# Patient Record
Sex: MALE | Race: WHITE | NOT HISPANIC OR LATINO | Employment: OTHER | URBAN - METROPOLITAN AREA
[De-identification: names, ages, dates, MRNs, and addresses within clinical notes are randomized per-mention and may not be internally consistent; named-entity substitution may affect disease eponyms.]

---

## 2017-11-07 ENCOUNTER — OFFICE VISIT (OUTPATIENT)
Dept: CARDIOLOGY | Facility: CLINIC | Age: 67
End: 2017-11-07

## 2017-11-07 VITALS
HEART RATE: 73 BPM | HEIGHT: 71 IN | BODY MASS INDEX: 36.26 KG/M2 | DIASTOLIC BLOOD PRESSURE: 98 MMHG | WEIGHT: 259 LBS | SYSTOLIC BLOOD PRESSURE: 158 MMHG

## 2017-11-07 DIAGNOSIS — I10 ESSENTIAL HYPERTENSION: ICD-10-CM

## 2017-11-07 DIAGNOSIS — I48.20 CHRONIC ATRIAL FIBRILLATION (HCC): Primary | ICD-10-CM

## 2017-11-07 PROCEDURE — 93000 ELECTROCARDIOGRAM COMPLETE: CPT | Performed by: INTERNAL MEDICINE

## 2017-11-07 PROCEDURE — 99213 OFFICE O/P EST LOW 20 MIN: CPT | Performed by: INTERNAL MEDICINE

## 2017-11-07 RX ORDER — CETIRIZINE HYDROCHLORIDE 10 MG/1
10 TABLET ORAL DAILY
COMMUNITY

## 2017-11-07 RX ORDER — DOXAZOSIN MESYLATE 4 MG/1
4 TABLET ORAL NIGHTLY
COMMUNITY
End: 2022-01-01 | Stop reason: ALTCHOICE

## 2017-11-07 RX ORDER — TRIAMTERENE AND HYDROCHLOROTHIAZIDE 75; 50 MG/1; MG/1
1 TABLET ORAL DAILY
COMMUNITY
End: 2021-01-01 | Stop reason: ALTCHOICE

## 2017-11-13 PROBLEM — I10 HYPERTENSION: Status: ACTIVE | Noted: 2017-11-13

## 2017-11-13 PROBLEM — I48.91 ATRIAL FIBRILLATION (HCC): Status: ACTIVE | Noted: 2017-11-13

## 2017-11-13 NOTE — PROGRESS NOTES
Subjective:     Encounter Date:11/07/2017      Patient ID: Wesley Billingsley is a 67 y.o. male.    Chief Complaint:  Hypertension   This is a chronic problem. The current episode started more than 1 year ago. The problem is unchanged. The problem is controlled. Associated symptoms include palpitations. Pertinent negatives include no chest pain or shortness of breath. There is no history of angina, kidney disease or CAD/MI.   Atrial Fibrillation   Presents for follow-up visit. Symptoms include palpitations. Symptoms are negative for chest pain, dizziness, hypertension, shortness of breath, syncope, tachycardia and weakness. Past medical history includes atrial fibrillation.       Patient presents today for reevaluation.  Occasionally has some palpitations.  He denies shortness of breath edema lightheadedness chest pain or fatigue.    Review of Systems   Constitution: Negative for weakness.   Cardiovascular: Positive for palpitations. Negative for chest pain and syncope.   Respiratory: Negative for shortness of breath.    Neurological: Negative for dizziness.   All other systems reviewed and are negative.        ECG 12 Lead  Date/Time: 11/7/2017 11:42 AM  Performed by: ARSEN ANDERSON  Authorized by: ARSEN ANDERSON   Comparison: compared with previous ECG from 7/12/2016  Similar to previous ECG  Rhythm: atrial fibrillation  Clinical impression: abnormal ECG               Objective:     Physical Exam   Constitutional: He is oriented to person, place, and time. He appears well-developed.   HENT:   Head: Normocephalic.   Eyes: Conjunctivae are normal.   Neck: Normal range of motion.   Cardiovascular: Normal rate and normal heart sounds.  An irregularly irregular rhythm present.   Pulmonary/Chest: Breath sounds normal.   Abdominal: Soft. Bowel sounds are normal.   Musculoskeletal: Normal range of motion. He exhibits no edema.   Neurological: He is alert and oriented to person, place, and time.   Skin: Skin is warm  and dry.   Psychiatric: He has a normal mood and affect. His behavior is normal.   Vitals reviewed.      Lab Review:       Assessment:          Diagnosis Plan   1. Chronic atrial fibrillation     2. Essential hypertension            Plan:       1.  History of atrial fibrillation.  Patient remains stable heart rates well-controlled he is anticoagulated.  2.  Hypertension blood pressures a little bit elevated today told to watch it closely if remains elevated to contact my office.  I also told him to minimize his salt intake.  3.  Otherwise I think he's doing well follow-up in one year sooner if he has issues    Atrial Fibrillation and Atrial Flutter  Assessment  • The patient has persistent atrial fibrillation  • This is non-valvular in etiology  • The patient's CHADS2-VASc score is 2  • A [id number] score of 2 or more is considered a high risk for a thromboembolic event  • Apixaban prescribed    Plan  • Continue in atrial fibrillation with rate control  • Continue apixaban for antithrombotic therapy, bleeding issues discussed  • Continue beta blocker for rate control

## 2019-02-01 ENCOUNTER — OFFICE VISIT CONVERTED (OUTPATIENT)
Dept: CARDIOLOGY | Facility: CLINIC | Age: 69
End: 2019-02-01
Attending: INTERNAL MEDICINE

## 2019-02-01 ENCOUNTER — CONVERSION ENCOUNTER (OUTPATIENT)
Dept: CARDIOLOGY | Facility: CLINIC | Age: 69
End: 2019-02-01

## 2019-03-27 ENCOUNTER — CONVERSION ENCOUNTER (OUTPATIENT)
Dept: CARDIOLOGY | Facility: CLINIC | Age: 69
End: 2019-03-27
Attending: INTERNAL MEDICINE

## 2019-04-02 ENCOUNTER — CONVERSION ENCOUNTER (OUTPATIENT)
Dept: CARDIOLOGY | Facility: CLINIC | Age: 69
End: 2019-04-02

## 2019-10-15 ENCOUNTER — OFFICE VISIT CONVERTED (OUTPATIENT)
Dept: CARDIOLOGY | Facility: CLINIC | Age: 69
End: 2019-10-15
Attending: INTERNAL MEDICINE

## 2019-10-15 ENCOUNTER — CONVERSION ENCOUNTER (OUTPATIENT)
Dept: CARDIOLOGY | Facility: CLINIC | Age: 69
End: 2019-10-15

## 2020-09-29 ENCOUNTER — OFFICE VISIT CONVERTED (OUTPATIENT)
Dept: CARDIOLOGY | Facility: CLINIC | Age: 70
End: 2020-09-29
Attending: INTERNAL MEDICINE

## 2020-09-29 ENCOUNTER — CONVERSION ENCOUNTER (OUTPATIENT)
Dept: CARDIOLOGY | Facility: CLINIC | Age: 70
End: 2020-09-29

## 2021-01-01 ENCOUNTER — TELEPHONE (OUTPATIENT)
Dept: CARDIOLOGY | Facility: CLINIC | Age: 71
End: 2021-01-01

## 2021-01-01 ENCOUNTER — OFFICE VISIT (OUTPATIENT)
Dept: CARDIOLOGY | Facility: CLINIC | Age: 71
End: 2021-01-01

## 2021-01-01 VITALS
SYSTOLIC BLOOD PRESSURE: 152 MMHG | HEART RATE: 62 BPM | DIASTOLIC BLOOD PRESSURE: 72 MMHG | WEIGHT: 261 LBS | HEIGHT: 72 IN | BODY MASS INDEX: 35.35 KG/M2

## 2021-01-01 DIAGNOSIS — I10 ESSENTIAL HYPERTENSION: Primary | ICD-10-CM

## 2021-01-01 PROCEDURE — 99214 OFFICE O/P EST MOD 30 MIN: CPT | Performed by: INTERNAL MEDICINE

## 2021-05-13 NOTE — PROGRESS NOTES
Progress Note      Patient Name: Wesley Billingsley   Patient ID: [id number]   Sex: Male   YOB: 1950    Primary Care Provider: Denis Bucio MD   Referring Provider: Denis Bucio MD    Visit Date: September 29, 2020    Provider: Pablo Hurley MD   Location: [facility]   Location Address: [address] [place] [address] [facility]  [address]  [place], [place]  [id number]   Location Phone: [phone]          Chief Complaint     Followup visit for atrial fibrillation and hypertension.       History Of Present Illness  REFERRING CARE PROVIDER: Denis Bucio MD   Wesley Billingsley is a 69 year old /White male with chronic atrial fibrillation and previous syncopal episode who is here for a routine followup visit. Last evaluation was in October of 2019. Recently, he was noted to have a high blood pressure, and three weeks back, the dose of metoprolol was increased to 100 mg twice daily by his primary care provider. Patient reports that his blood pressure is still on the higher side during home recordings, systolic mostly in the high 140s and low 150s. He denies having any major palpitations. He has shortness of breath on activities on moderate exertion. No chest pain. No syncopal episodes. No dizziness. Fairly active at baseline. He is able to walk around comfortably with the prosthetic leg at this time.   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   PSYCHOSOCIAL HISTORY: Denies alcohol or tobacco use. Walks 2-3 miles for exercise.   CURRENT MEDICATIONS: Eliquis 5 mg b.i.d.; metoprolol  mg b.i.d.; triamterene-hydrochlorothiazide 75-50 mg daily; terazosin 2 mg daily; Zyrtec 10 mg daily; Ambien  10 mg p.r.n.; gabapentin 300 mg p.r.n.; Percocet p.r.n.; alprazolam 0.5 mg p.r.n.       Review of Systems  · Cardiovascular  o Admits  o : palpitations (fast, fluttering, or skipping beats), shortness of breath while walking or lying flat  o Denies  o : swelling (feet, ankles, hands), chest pain or angina pectoris   · Respiratory  o Denies  o : chronic or frequent cough, asthma or wheezing      Vitals  Date Time BP Position Site L\R Cuff Size HR RR TEMP (F) WT  HT  BMI kg/m2 BSA m2 O2 Sat FR L/min FiO2 HC       09/29/2020 04:20 /88 Sitting    69 - R  97.1 260lbs 0oz 6'   35.26 2.45       09/29/2020 04:20 /81 Sitting    57 - R                   Physical Examination  · Respiratory  o Auscultation of Lungs  o : Clear to auscultation bilaterally. No crackles or rhonchi.  · Cardiovascular  o Heart  o : Irregularly irregular. No S3. No murmur, rubs, or gallops.   · Gastrointestinal  o Abdominal Examination  o : Soft, nontender, nondistended. No free fluid. Bowel sounds heard in all four quadrants.  · Extremities  o Extremities  o : Left above-knee amputation. Right lower extremity with no pedal edema. Distal pulses present.   · Labs  o Labs  o : On 08/28/2020, hemoglobin 13.2, hematocrit 39.3, platelet count 205,000, BUN 14, creatinine 0.98, sodium 36, potassium 4.4, chloride 96, calcium 9.3, total protein 7.3, albumin 4.3, AST 23, ALT 20, total cholesterol 164, triglycerides 114, LDL 82, hemoglobin A1c 5.2%, TSH 3.48.           Assessment     ASSESSMENT & PLAN:    1.  Hypertension.  Blood pressure is on the higher side per home recordings.  Continue metoprolol and        triamterene-hydrochlorothiazide at the current dose.  Will add amlodipine 2.5 mg p.o. daily for better        blood pressure control.  2.  Atrial fibrillation, chronic.  Ventricular rates well controlled.  No significant symptoms.  Continue metoprolol        and Eliquis.    3.  Follow up in 6 months.        MD DENTON Petty:barrie              Electronically Signed by: Dejah Aggarwal-, Other -Author on October 5, 2020 08:15:30 AM  Electronically Co-signed by: Pablo Hurley MD -Reviewer on October 5, 2020 08:29:26 AM

## 2021-05-14 VITALS
BODY MASS INDEX: 35.21 KG/M2 | SYSTOLIC BLOOD PRESSURE: 145 MMHG | HEIGHT: 72 IN | TEMPERATURE: 97.1 F | HEART RATE: 69 BPM | WEIGHT: 260 LBS | DIASTOLIC BLOOD PRESSURE: 88 MMHG

## 2021-05-15 VITALS
BODY MASS INDEX: 29.39 KG/M2 | SYSTOLIC BLOOD PRESSURE: 133 MMHG | HEIGHT: 72 IN | WEIGHT: 217 LBS | DIASTOLIC BLOOD PRESSURE: 73 MMHG | HEART RATE: 77 BPM

## 2021-05-15 VITALS
BODY MASS INDEX: 33.18 KG/M2 | HEART RATE: 103 BPM | WEIGHT: 245 LBS | DIASTOLIC BLOOD PRESSURE: 63 MMHG | SYSTOLIC BLOOD PRESSURE: 144 MMHG | HEIGHT: 72 IN

## 2021-05-15 VITALS
SYSTOLIC BLOOD PRESSURE: 161 MMHG | HEIGHT: 72 IN | HEART RATE: 73 BPM | WEIGHT: 253 LBS | BODY MASS INDEX: 34.27 KG/M2 | DIASTOLIC BLOOD PRESSURE: 91 MMHG

## 2021-10-12 ENCOUNTER — OFFICE VISIT (OUTPATIENT)
Dept: CARDIOLOGY | Facility: CLINIC | Age: 71
End: 2021-10-12

## 2021-10-12 VITALS
SYSTOLIC BLOOD PRESSURE: 166 MMHG | DIASTOLIC BLOOD PRESSURE: 76 MMHG | BODY MASS INDEX: 35.08 KG/M2 | HEART RATE: 71 BPM | HEIGHT: 72 IN | WEIGHT: 259 LBS

## 2021-10-12 DIAGNOSIS — I48.11 LONGSTANDING PERSISTENT ATRIAL FIBRILLATION (HCC): Primary | ICD-10-CM

## 2021-10-12 DIAGNOSIS — I10 ESSENTIAL HYPERTENSION: ICD-10-CM

## 2021-10-12 PROCEDURE — 99214 OFFICE O/P EST MOD 30 MIN: CPT | Performed by: INTERNAL MEDICINE

## 2021-10-12 RX ORDER — MULTIPLE VITAMINS W/ MINERALS TAB 9MG-400MCG
1 TAB ORAL DAILY
COMMUNITY

## 2021-10-12 RX ORDER — AMLODIPINE BESYLATE 10 MG/1
10 TABLET ORAL DAILY
Qty: 90 TABLET | Refills: 2 | Status: SHIPPED | OUTPATIENT
Start: 2021-10-12

## 2021-10-12 RX ORDER — AMLODIPINE BESYLATE 5 MG/1
TABLET ORAL
COMMUNITY
Start: 2021-09-12 | End: 2021-10-12

## 2021-10-12 RX ORDER — ALPRAZOLAM 0.5 MG/1
TABLET ORAL
COMMUNITY
Start: 2021-09-27

## 2021-10-12 RX ORDER — ZOLPIDEM TARTRATE 10 MG/1
TABLET ORAL
COMMUNITY
Start: 2021-09-26

## 2021-10-12 RX ORDER — VALSARTAN 80 MG/1
80 TABLET ORAL DAILY
Qty: 90 TABLET | Refills: 2 | Status: SHIPPED | OUTPATIENT
Start: 2021-10-12

## 2021-10-12 RX ORDER — GABAPENTIN 300 MG/1
CAPSULE ORAL
COMMUNITY
Start: 2021-09-26

## 2021-10-12 RX ORDER — OXYCODONE AND ACETAMINOPHEN 10; 325 MG/1; MG/1
TABLET ORAL
COMMUNITY
Start: 2021-10-03

## 2021-10-12 RX ORDER — VALSARTAN 80 MG/1
TABLET ORAL
COMMUNITY
Start: 2021-09-12 | End: 2021-10-12 | Stop reason: SDUPTHER

## 2021-10-27 NOTE — ASSESSMENT & PLAN NOTE
Blood pressure on the higher side in the office today.  Blood pressure on the higher side at home as well.  Will increase amlodipine dose to 10 mg daily.  Continue valsartan, metoprolol and triamterene/amiloride at the current dose.  Will do a close follow-up in 1 month with home blood pressure log.  He is advised to call our office earlier for any persistently elevated or low blood pressure in the meantime.

## 2021-10-27 NOTE — ASSESSMENT & PLAN NOTE
Ventricular rates very well controlled.  Continue metoprolol at the current dose along with Eliquis for anticoagulation.  LV function is preserved.  Recent labs showed stable hemoglobin.

## 2021-10-27 NOTE — PROGRESS NOTES
Relation, previous syncopal episode who is here for follow-up visit.  CARDIOLOGY FOLLOW-UP PROGRESS NOTE        Chief Complaint  Hypertension and Atrial Fibrillation    Subjective            Wesley Billingsley presents to [facility]  History of Present Illness    This is a 71-year-old male with chronic atrial fibrillation, he was last seen in the office on September 29, 2020.  Today, he denies having any complaints.  There is no dizziness or palpitations.  He is able to walk around with his prosthetic leg.  Denies any dizziness or syncopal episodes.  No bleeding manifestations.  No recent hospitalizations or ER visits.   Blood pressure was noted to be on the higher side during recent PCP office visit.  Valsartan was added to the regimen.    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     Medical History:  Past Medical History:   Diagnosis Date   • Atrial fibrillation (HCC)     chronic   • Hypertension        Surgical History: has a past surgical history that includes Knee surgery.     Family History: family history includes Cancer in his sister; Diabetes in his mother; Heart disease in his father and mother; Hypertension in his father and mother.     Social History: reports that he has never smoked. He has never used smokeless tobacco. He reports current alcohol use. He reports that he does not use drugs.    Allergies: Patient has no known allergies.    Current Outpatient Medications on File Prior to Visit   Medication Sig   • ALPRAZolam (XANAX) 0.5 MG tablet    • apixaban (ELIQUIS) 5 MG tablet tablet Take 5 mg by mouth 2 (Two) Times a Day.   • cetirizine (zyrTEC) 10 MG tablet Take  "10 mg by mouth Daily.   • gabapentin (NEURONTIN) 300 MG capsule    • metoprolol succinate XL (TOPROL-XL) 200 MG 24 hr tablet TAKE ONE-HALF TABLET BY MOUTH TWICE DAILY (Patient taking differently: Take 200 mg by mouth 2 (two) times a day.)   • multivitamin with minerals tablet tablet Take 1 tablet by mouth Daily.   • oxyCODONE-acetaminophen (PERCOCET)  MG per tablet    • triamterene-hydrochlorothiazide (MAXZIDE) 75-50 MG per tablet Take 1 tablet by mouth Daily.   • zolpidem (AMBIEN) 10 MG tablet    • doxazosin (CARDURA) 4 MG tablet Take 4 mg by mouth Every Night.     No current facility-administered medications on file prior to visit.          Review of Systems   Respiratory: Negative for cough, shortness of breath and wheezing.    Cardiovascular: Negative for chest pain, palpitations and leg swelling.   Gastrointestinal: Negative for nausea and vomiting.   Neurological: Negative for dizziness and syncope.        Objective     /76 (BP Location: Left arm, Patient Position: Sitting)   Pulse 71   Ht 182.9 cm (72\")   Wt 117 kg (259 lb)   BMI 35.13 kg/m²       Physical Exam    General : Alert, awake, no acute distress  Neck : Supple, no carotid bruit, no jugular venous distention  CVS : Irregularly irregular, no murmur, rubs or gallops  Lungs: Clear to auscultation bilaterally, no crackles or rhonchi  Abdomen: Soft, nontender, bowel sounds heard in all 4 quadrants  Extremities: Warm, well-perfused, no pedal edema    Result Review :     The following data was reviewed by: Pablo Hurley MD on 10/12/2021:    Labs done on 7/14/2021 at PCP office showed hemoglobin 15.1 hematocrit 44.7 platelet count 272 BUN 14 creatinine 0.86 sodium 139 potassium 4.5 chloride 102 AST 31 ALT 28    Total cholesterol 179 triglycerides 82 HDL 55 LDL is 109 hemoglobin A1c is 5.2                 Assessment and Plan        Diagnoses and all orders for this visit:    1. Longstanding persistent atrial fibrillation (HCC) " (Primary)  Assessment & Plan:  Ventricular rates very well controlled.  Continue metoprolol at the current dose along with Eliquis for anticoagulation.  LV function is preserved.  Recent labs showed stable hemoglobin.      2. Essential hypertension  Assessment & Plan:  Blood pressure on the higher side in the office today.  Blood pressure on the higher side at home as well.  Will increase amlodipine dose to 10 mg daily.  Continue valsartan, metoprolol and triamterene/amiloride at the current dose.  Will do a close follow-up in 1 month with home blood pressure log.  He is advised to call our office earlier for any persistently elevated or low blood pressure in the meantime.    Orders:  -     amLODIPine (NORVASC) 10 MG tablet; 1 tablet Daily.  Dispense: 90 tablet; Refill: 2  -     valsartan (DIOVAN) 80 MG tablet; Take 1 tablet by mouth Daily.  Dispense: 90 tablet; Refill: 2            Follow Up     Return in about 1 month (around 11/12/2021) for Recheck.    Patient was given instructions and counseling regarding his condition or for health maintenance advice. Please see specific information pulled into the AVS if appropriate.

## 2021-11-17 NOTE — TELEPHONE ENCOUNTER
Procedure: Colonoscopy and EGD    Med Directive: Eliquis    PMH: Chronic Afib; BPH; Syncopal episode; Neg for DM    Last Seen: 10/12/2021    Labs (7/14/2021): cr 0.86

## 2021-11-18 NOTE — TELEPHONE ENCOUNTER
Mr. Billingsley may proceed with EGD and colonoscopy as planned.  No further preoperative cardiac work-up required.  He will be at a moderate risk for perioperative cardiac events.    Eliquis may be held for 2 days before the anticipated surgery.      Electronically signed by Pablo Hurley MD, 11/18/21, 2:11 PM EST.

## 2021-11-19 NOTE — PROGRESS NOTES
CARDIOLOGY FOLLOW-UP PROGRESS NOTE        Chief Complaint  Follow-up, Atrial Fibrillation, and Hypertension    Subjective            Wesley Billingsley presents to [facility]  History of Present Illness    This is a 71-year-old male with chronic atrial fibrillation, hypertension who is here for follow-up visit.  During last office visit, his blood pressure was noted to be significantly elevated.  We recommended to increase the dose of amlodipine to 10 mg daily.  Today patient got a log of his home blood pressure readings and they are better controlled than before.  However he is taking 5 mg of amlodipine in the morning and 2.5 mg in the evening.  He continues to take metoprolol and valsartan as before.  Overall he feels fine denies any new complaints.  Denies any palpitations or dizziness or chest pain.       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     Medical History:  Past Medical History:   Diagnosis Date   • Atrial fibrillation (HCC)     chronic   • Hypertension        Surgical History: has a past surgical history that includes Knee surgery.     Family History: family history includes Cancer in his sister; Diabetes in his mother; Heart disease in his father and mother; Hypertension in his father and mother.     Social History: reports that he has never smoked. He has never used smokeless tobacco. He reports current alcohol use. He reports that he does not use drugs.    Allergies: Patient has no known allergies.    Current Outpatient Medications on File Prior to Visit   Medication Sig   • ALPRAZolam (XANAX) 0.5 MG tablet    • amLODIPine (NORVASC) 10 MG tablet 1 tablet  "Daily.   • apixaban (ELIQUIS) 5 MG tablet tablet Take 5 mg by mouth 2 (Two) Times a Day.   • cetirizine (zyrTEC) 10 MG tablet Take 10 mg by mouth Daily.   • doxazosin (CARDURA) 4 MG tablet Take 4 mg by mouth Every Night.   • gabapentin (NEURONTIN) 300 MG capsule    • metoprolol succinate XL (TOPROL-XL) 200 MG 24 hr tablet TAKE ONE-HALF TABLET BY MOUTH TWICE DAILY (Patient taking differently: Take 200 mg by mouth 2 (two) times a day.)   • multivitamin with minerals tablet tablet Take 1 tablet by mouth Daily.   • oxyCODONE-acetaminophen (PERCOCET)  MG per tablet    • valsartan (DIOVAN) 80 MG tablet Take 1 tablet by mouth Daily.   • zolpidem (AMBIEN) 10 MG tablet    • [DISCONTINUED] triamterene-hydrochlorothiazide (MAXZIDE) 75-50 MG per tablet Take 1 tablet by mouth Daily.     No current facility-administered medications on file prior to visit.          Review of Systems   Respiratory: Negative for cough, shortness of breath and wheezing.    Cardiovascular: Negative for chest pain, palpitations and leg swelling.   Gastrointestinal: Negative for nausea and vomiting.   Neurological: Negative for dizziness and syncope.        Objective     /72   Pulse 62   Ht 182.9 cm (72\")   Wt 118 kg (261 lb)   BMI 35.40 kg/m²       Physical Exam    General : Alert, awake, no acute distress  Neck : Supple, no carotid bruit, no jugular venous distention  CVS : Irregularly irregular, no murmur, rubs or gallops  Lungs: Clear to auscultation bilaterally, no crackles or rhonchi  Abdomen: Soft, nontender, bowel sounds heard in all 4 quadrants  Extremities: Warm, well-perfused, no pedal edema    Result Review :     The following data was reviewed by: Pablo Hurley MD on 11/17/2021:                   Assessment and Plan        Diagnoses and all orders for this visit:    1. Essential hypertension (Primary)  Assessment & Plan:  Blood pressure is better controlled today, but still above goal.  Home blood pressure log also shows " intermittent episodes of high blood pressure.  Recommend to increase the dose of amlodipine to 10 mg daily as suggested during the previous visit.  He just picked up the prescription yesterday.  He will take valsartan and metoprolol in the morning.  Amlodipine and the second dose of metoprolol can be taken in the evening.  He will continue to check blood pressure intermittently at home.          2.  Atrial fibrillation    No new changes.  Continue same medicines.        Follow Up     Return in about 6 months (around 5/17/2022) for Next scheduled follow up.    Patient was given instructions and counseling regarding his condition or for health maintenance advice. Please see specific information pulled into the AVS if appropriate.

## 2021-11-19 NOTE — ASSESSMENT & PLAN NOTE
Blood pressure is better controlled today, but still above goal.  Home blood pressure log also shows intermittent episodes of high blood pressure.  Recommend to increase the dose of amlodipine to 10 mg daily as suggested during the previous visit.  He just picked up the prescription yesterday.  He will take valsartan and metoprolol in the morning.  Amlodipine and the second dose of metoprolol can be taken in the evening.  He will continue to check blood pressure intermittently at home.

## 2022-01-01 ENCOUNTER — TRANSCRIBE ORDERS (OUTPATIENT)
Dept: ADMINISTRATIVE | Facility: HOSPITAL | Age: 72
End: 2022-01-01

## 2022-01-01 ENCOUNTER — APPOINTMENT (OUTPATIENT)
Dept: GENERAL RADIOLOGY | Facility: HOSPITAL | Age: 72
End: 2022-01-01

## 2022-01-01 ENCOUNTER — HOSPITAL ENCOUNTER (EMERGENCY)
Facility: HOSPITAL | Age: 72
Discharge: LEFT BEFORE DISCHARGE | End: 2022-08-22

## 2022-01-01 ENCOUNTER — OFFICE VISIT (OUTPATIENT)
Dept: CARDIOLOGY | Facility: CLINIC | Age: 72
End: 2022-01-01

## 2022-01-01 ENCOUNTER — LAB (OUTPATIENT)
Dept: LAB | Facility: HOSPITAL | Age: 72
End: 2022-01-01

## 2022-01-01 ENCOUNTER — TELEPHONE (OUTPATIENT)
Dept: CARDIOLOGY | Facility: CLINIC | Age: 72
End: 2022-01-01

## 2022-01-01 ENCOUNTER — HOSPITAL ENCOUNTER (OUTPATIENT)
Dept: GENERAL RADIOLOGY | Facility: HOSPITAL | Age: 72
Discharge: HOME | End: 2022-08-08
Admitting: FAMILY MEDICINE

## 2022-01-01 VITALS
TEMPERATURE: 98 F | BODY MASS INDEX: 35.74 KG/M2 | SYSTOLIC BLOOD PRESSURE: 134 MMHG | RESPIRATION RATE: 18 BRPM | DIASTOLIC BLOOD PRESSURE: 74 MMHG | WEIGHT: 263.89 LBS | HEART RATE: 67 BPM | HEIGHT: 72 IN | OXYGEN SATURATION: 97 %

## 2022-01-01 VITALS
DIASTOLIC BLOOD PRESSURE: 49 MMHG | SYSTOLIC BLOOD PRESSURE: 130 MMHG | HEIGHT: 72 IN | HEART RATE: 69 BPM | BODY MASS INDEX: 37.11 KG/M2 | WEIGHT: 274 LBS

## 2022-01-01 VITALS
HEIGHT: 72 IN | WEIGHT: 264 LBS | DIASTOLIC BLOOD PRESSURE: 54 MMHG | SYSTOLIC BLOOD PRESSURE: 128 MMHG | HEART RATE: 50 BPM | BODY MASS INDEX: 35.76 KG/M2

## 2022-01-01 DIAGNOSIS — R06.02 SOB (SHORTNESS OF BREATH): ICD-10-CM

## 2022-01-01 DIAGNOSIS — Z12.5 SCREENING FOR PROSTATE CANCER: ICD-10-CM

## 2022-01-01 DIAGNOSIS — R53.83 TIREDNESS: ICD-10-CM

## 2022-01-01 DIAGNOSIS — I10 ESSENTIAL HYPERTENSION, MALIGNANT: ICD-10-CM

## 2022-01-01 DIAGNOSIS — Z79.899 ENCOUNTER FOR LONG-TERM (CURRENT) USE OF OTHER MEDICATIONS: ICD-10-CM

## 2022-01-01 DIAGNOSIS — R06.00 DYSPNEA, UNSPECIFIED TYPE: ICD-10-CM

## 2022-01-01 DIAGNOSIS — I10 ESSENTIAL HYPERTENSION: ICD-10-CM

## 2022-01-01 DIAGNOSIS — D64.9 ANEMIA, UNSPECIFIED TYPE: Primary | ICD-10-CM

## 2022-01-01 DIAGNOSIS — R06.02 SOB (SHORTNESS OF BREATH): Primary | ICD-10-CM

## 2022-01-01 DIAGNOSIS — I48.11 LONGSTANDING PERSISTENT ATRIAL FIBRILLATION: Primary | ICD-10-CM

## 2022-01-01 DIAGNOSIS — I48.11 LONGSTANDING PERSISTENT ATRIAL FIBRILLATION: ICD-10-CM

## 2022-01-01 DIAGNOSIS — R73.09 IMPAIRED GLUCOSE TOLERANCE TEST: ICD-10-CM

## 2022-01-01 DIAGNOSIS — E55.9 VITAMIN D DEFICIENCY: ICD-10-CM

## 2022-01-01 DIAGNOSIS — E78.5 HYPERLIPIDEMIA, UNSPECIFIED HYPERLIPIDEMIA TYPE: ICD-10-CM

## 2022-01-01 DIAGNOSIS — R50.9 FEVER IN ADULT: ICD-10-CM

## 2022-01-01 DIAGNOSIS — I10 ESSENTIAL HYPERTENSION: Primary | ICD-10-CM

## 2022-01-01 DIAGNOSIS — R05.9 COUGH: ICD-10-CM

## 2022-01-01 DIAGNOSIS — I25.10 DISEASE OF CARDIOVASCULAR SYSTEM: ICD-10-CM

## 2022-01-01 DIAGNOSIS — E78.5 HYPERLIPIDEMIA, UNSPECIFIED HYPERLIPIDEMIA TYPE: Primary | ICD-10-CM

## 2022-01-01 DIAGNOSIS — R05.9 COUGH: Primary | ICD-10-CM

## 2022-01-01 LAB
25(OH)D3 SERPL-MCNC: 50.2 NG/ML (ref 30–100)
ALBUMIN SERPL-MCNC: 4.4 G/DL (ref 3.5–5.2)
ALBUMIN SERPL-MCNC: 4.5 G/DL (ref 3.5–5.2)
ALBUMIN/GLOB SERPL: 1.3 G/DL
ALBUMIN/GLOB SERPL: 1.6 G/DL
ALP SERPL-CCNC: 48 U/L (ref 39–117)
ALP SERPL-CCNC: 57 U/L (ref 39–117)
ALT SERPL W P-5'-P-CCNC: 15 U/L (ref 1–41)
ALT SERPL W P-5'-P-CCNC: 19 U/L (ref 1–41)
ANION GAP SERPL CALCULATED.3IONS-SCNC: 10 MMOL/L (ref 5–15)
ANION GAP SERPL CALCULATED.3IONS-SCNC: 9.5 MMOL/L (ref 5–15)
AST SERPL-CCNC: 20 U/L (ref 1–40)
AST SERPL-CCNC: 26 U/L (ref 1–40)
BASOPHILS # BLD AUTO: 0.03 10*3/MM3 (ref 0–0.2)
BASOPHILS NFR BLD AUTO: 0.7 % (ref 0–1.5)
BILIRUB SERPL-MCNC: 0.3 MG/DL (ref 0–1.2)
BILIRUB SERPL-MCNC: 0.6 MG/DL (ref 0–1.2)
BUN SERPL-MCNC: 18 MG/DL (ref 8–23)
BUN SERPL-MCNC: 20 MG/DL (ref 8–23)
BUN/CREAT SERPL: 14.8 (ref 7–25)
BUN/CREAT SERPL: 20 (ref 7–25)
CALCIUM SPEC-SCNC: 9.1 MG/DL (ref 8.6–10.5)
CALCIUM SPEC-SCNC: 9.4 MG/DL (ref 8.6–10.5)
CHLORIDE SERPL-SCNC: 88 MMOL/L (ref 98–107)
CHLORIDE SERPL-SCNC: 96 MMOL/L (ref 98–107)
CHOLEST SERPL-MCNC: 147 MG/DL (ref 0–200)
CO2 SERPL-SCNC: 28.5 MMOL/L (ref 22–29)
CO2 SERPL-SCNC: 29 MMOL/L (ref 22–29)
CREAT SERPL-MCNC: 0.9 MG/DL (ref 0.76–1.27)
CREAT SERPL-MCNC: 1.35 MG/DL (ref 0.76–1.27)
DEPRECATED RDW RBC AUTO: 46.5 FL (ref 37–54)
DEPRECATED RDW RBC AUTO: 51.3 FL (ref 37–54)
EGFRCR SERPLBLD CKD-EPI 2021: 56.5 ML/MIN/1.73
EGFRCR SERPLBLD CKD-EPI 2021: 91.9 ML/MIN/1.73
EOSINOPHIL # BLD AUTO: 0.14 10*3/MM3 (ref 0–0.4)
EOSINOPHIL NFR BLD AUTO: 3.3 % (ref 0.3–6.2)
ERYTHROCYTE [DISTWIDTH] IN BLOOD BY AUTOMATED COUNT: 13 % (ref 12.3–15.4)
ERYTHROCYTE [DISTWIDTH] IN BLOOD BY AUTOMATED COUNT: 13.8 % (ref 12.3–15.4)
GLOBULIN UR ELPH-MCNC: 2.9 GM/DL
GLOBULIN UR ELPH-MCNC: 3.5 GM/DL
GLUCOSE SERPL-MCNC: 102 MG/DL (ref 65–99)
GLUCOSE SERPL-MCNC: 89 MG/DL (ref 65–99)
HBA1C MFR BLD: 5.3 % (ref 4.8–5.6)
HCT VFR BLD AUTO: 37.9 % (ref 37.5–51)
HCT VFR BLD AUTO: 39.6 % (ref 37.5–51)
HDLC SERPL-MCNC: 63 MG/DL (ref 40–60)
HEMOCCULT STL QL IA: NEGATIVE
HGB BLD-MCNC: 12 G/DL (ref 13–17.7)
HGB BLD-MCNC: 13.1 G/DL (ref 13–17.7)
IMM GRANULOCYTES # BLD AUTO: 0 10*3/MM3 (ref 0–0.05)
IMM GRANULOCYTES NFR BLD AUTO: 0 % (ref 0–0.5)
LDLC SERPL CALC-MCNC: 71 MG/DL (ref 0–100)
LDLC/HDLC SERPL: 1.13 {RATIO}
LYMPHOCYTES # BLD AUTO: 1.64 10*3/MM3 (ref 0.7–3.1)
LYMPHOCYTES NFR BLD AUTO: 38.5 % (ref 19.6–45.3)
MAGNESIUM SERPL-MCNC: 2.2 MG/DL (ref 1.6–2.4)
MCH RBC QN AUTO: 31.8 PG (ref 26.6–33)
MCH RBC QN AUTO: 31.8 PG (ref 26.6–33)
MCHC RBC AUTO-ENTMCNC: 31.7 G/DL (ref 31.5–35.7)
MCHC RBC AUTO-ENTMCNC: 33.1 G/DL (ref 31.5–35.7)
MCV RBC AUTO: 100.5 FL (ref 79–97)
MCV RBC AUTO: 96.1 FL (ref 79–97)
MONOCYTES # BLD AUTO: 0.71 10*3/MM3 (ref 0.1–0.9)
MONOCYTES NFR BLD AUTO: 16.7 % (ref 5–12)
NEUTROPHILS NFR BLD AUTO: 1.74 10*3/MM3 (ref 1.7–7)
NEUTROPHILS NFR BLD AUTO: 40.8 % (ref 42.7–76)
NT-PROBNP SERPL-MCNC: 1133 PG/ML (ref 0–900)
PLATELET # BLD AUTO: 179 10*3/MM3 (ref 140–450)
PLATELET # BLD AUTO: 201 10*3/MM3 (ref 140–450)
PMV BLD AUTO: 8.5 FL (ref 6–12)
PMV BLD AUTO: 8.8 FL (ref 6–12)
POTASSIUM SERPL-SCNC: 4.3 MMOL/L (ref 3.5–5.2)
POTASSIUM SERPL-SCNC: 4.3 MMOL/L (ref 3.5–5.2)
PROT SERPL-MCNC: 7.4 G/DL (ref 6–8.5)
PROT SERPL-MCNC: 7.9 G/DL (ref 6–8.5)
PSA SERPL-MCNC: 0.87 NG/ML (ref 0–4)
QT INTERVAL: 468 MS
RBC # BLD AUTO: 3.77 10*6/MM3 (ref 4.14–5.8)
RBC # BLD AUTO: 4.12 10*6/MM3 (ref 4.14–5.8)
SODIUM SERPL-SCNC: 126 MMOL/L (ref 136–145)
SODIUM SERPL-SCNC: 135 MMOL/L (ref 136–145)
TRIGL SERPL-MCNC: 63 MG/DL (ref 0–150)
TSH SERPL DL<=0.05 MIU/L-ACNC: 2.79 UIU/ML (ref 0.27–4.2)
TSH SERPL DL<=0.05 MIU/L-ACNC: 6.14 UIU/ML (ref 0.27–4.2)
VIT B12 BLD-MCNC: 453 PG/ML (ref 211–946)
VLDLC SERPL-MCNC: 13 MG/DL (ref 5–40)
WBC NRBC COR # BLD: 4.26 10*3/MM3 (ref 3.4–10.8)
WBC NRBC COR # BLD: 4.34 10*3/MM3 (ref 3.4–10.8)

## 2022-01-01 PROCEDURE — 36415 COLL VENOUS BLD VENIPUNCTURE: CPT

## 2022-01-01 PROCEDURE — 80053 COMPREHEN METABOLIC PANEL: CPT

## 2022-01-01 PROCEDURE — 82607 VITAMIN B-12: CPT

## 2022-01-01 PROCEDURE — 93005 ELECTROCARDIOGRAM TRACING: CPT

## 2022-01-01 PROCEDURE — 83036 HEMOGLOBIN GLYCOSYLATED A1C: CPT

## 2022-01-01 PROCEDURE — 84443 ASSAY THYROID STIM HORMONE: CPT

## 2022-01-01 PROCEDURE — 80061 LIPID PANEL: CPT

## 2022-01-01 PROCEDURE — 85027 COMPLETE CBC AUTOMATED: CPT

## 2022-01-01 PROCEDURE — 85025 COMPLETE CBC W/AUTO DIFF WBC: CPT

## 2022-01-01 PROCEDURE — 99214 OFFICE O/P EST MOD 30 MIN: CPT | Performed by: INTERNAL MEDICINE

## 2022-01-01 PROCEDURE — 82306 VITAMIN D 25 HYDROXY: CPT

## 2022-01-01 PROCEDURE — 82274 ASSAY TEST FOR BLOOD FECAL: CPT

## 2022-01-01 PROCEDURE — 99211 OFF/OP EST MAY X REQ PHY/QHP: CPT

## 2022-01-01 PROCEDURE — 71046 X-RAY EXAM CHEST 2 VIEWS: CPT

## 2022-01-01 PROCEDURE — 99213 OFFICE O/P EST LOW 20 MIN: CPT | Performed by: INTERNAL MEDICINE

## 2022-01-01 PROCEDURE — G0103 PSA SCREENING: HCPCS

## 2022-01-01 PROCEDURE — 83735 ASSAY OF MAGNESIUM: CPT

## 2022-01-01 PROCEDURE — 83880 ASSAY OF NATRIURETIC PEPTIDE: CPT

## 2022-01-01 RX ORDER — FUROSEMIDE 40 MG/1
40 TABLET ORAL DAILY
Qty: 30 TABLET | Refills: 1 | Status: SHIPPED | OUTPATIENT
Start: 2022-01-01 | End: 2022-01-01 | Stop reason: SDUPTHER

## 2022-01-01 RX ORDER — SODIUM CHLORIDE 0.9 % (FLUSH) 0.9 %
10 SYRINGE (ML) INJECTION AS NEEDED
Status: DISCONTINUED | OUTPATIENT
Start: 2022-01-01 | End: 2022-01-01 | Stop reason: HOSPADM

## 2022-01-01 RX ORDER — METOPROLOL SUCCINATE 100 MG/1
50 TABLET, EXTENDED RELEASE ORAL 2 TIMES DAILY
Status: SHIPPED | COMMUNITY
Start: 2022-01-01

## 2022-01-01 RX ORDER — AMLODIPINE BESYLATE 10 MG/1
TABLET ORAL
Qty: 30 TABLET | Refills: 3 | Status: SHIPPED | OUTPATIENT
Start: 2022-01-01 | End: 2022-01-01 | Stop reason: SDUPTHER

## 2022-01-01 RX ORDER — AMLODIPINE BESYLATE 10 MG/1
10 TABLET ORAL DAILY
Qty: 90 TABLET | Refills: 0 | Status: SHIPPED | OUTPATIENT
Start: 2022-01-01 | End: 2022-01-01 | Stop reason: SDUPTHER

## 2022-01-01 RX ORDER — FUROSEMIDE 40 MG/1
60 TABLET ORAL DAILY
Qty: 135 TABLET | Refills: 3 | Status: SHIPPED | OUTPATIENT
Start: 2022-01-01

## 2022-01-01 RX ORDER — METOPROLOL SUCCINATE 100 MG/1
100 TABLET, EXTENDED RELEASE ORAL 2 TIMES DAILY
COMMUNITY
End: 2022-01-01

## 2022-08-24 PROBLEM — R06.02 SOB (SHORTNESS OF BREATH): Status: ACTIVE | Noted: 2022-01-01

## 2022-08-24 NOTE — PROGRESS NOTES
"  CARDIOLOGY FOLLOW-UP PROGRESS NOTE        Chief Complaint  Shortness of Breath (Pt c/o chest discomfort like \"pressure\"), Atrial Fibrillation, and Fatigue (Feeling tired all the time)    Subjective            Wesley Billingsley presents to [facility] MEDICAL [facility] CARDIOLOGY  History of Present Illness      Mr Billingsley is here for an urgent visit due to increasing shortness of breath, fatigue and pedal edema.  Symptoms are going on for the past 2 weeks.  Any activity including walking is making him increasingly short of breath.  He also reports some heaviness in the chest with activity.  Denies any dizziness.  Denies syncopal episodes.  Denies any major palpitations, however noticed that his heart rate is mostly in 40s.  He was actually seen in [facility] emergency room on 8/22/2022, triage EKG showed atrial fibrillation.  He he left without being seen by the provider due to long wait.       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     Medical History:  Past Medical History:   Diagnosis Date   • Atrial fibrillation (HCC)     chronic   • Hypertension        Surgical History: has a past surgical history that includes Knee surgery.     Family History: family history includes Cancer in his sister; Diabetes in his mother; Heart disease in his father and mother; Hypertension in his father and mother.     Social History: reports that he has never smoked. He has never used smokeless tobacco. He reports that he does not drink alcohol and does not use drugs.    Allergies: Patient has no known allergies.    Current Outpatient Medications on File Prior to Visit   Medication Sig   • ALPRAZolam (XANAX) 0.5 " "MG tablet    • amLODIPine (NORVASC) 10 MG tablet 1 tablet Daily.   • apixaban (ELIQUIS) 5 MG tablet tablet Take 5 mg by mouth 2 (Two) Times a Day.   • cetirizine (zyrTEC) 10 MG tablet Take 10 mg by mouth Daily.   • gabapentin (NEURONTIN) 300 MG capsule Six times a day   • metoprolol succinate XL (TOPROL-XL) 100 MG 24 hr tablet Take 0.5 tablets by mouth 2 (Two) Times a Day.   • multivitamin with minerals tablet tablet Take 1 tablet by mouth Daily.   • oxyCODONE-acetaminophen (PERCOCET)  MG per tablet    • valsartan (DIOVAN) 80 MG tablet Take 1 tablet by mouth Daily. (Patient taking differently: Take 160 mg by mouth Daily.)   • zolpidem (AMBIEN) 10 MG tablet    • Metoprolol succinate XL (TOPROL-XL) 100 MG 24 hr tablet Take 100 mg by mouth 2 (Two) Times a Day.         Review of Systems   Constitutional: Positive for fatigue.   Respiratory: Positive for chest tightness and shortness of breath. Negative for cough and wheezing.    Cardiovascular: Positive for leg swelling. Negative for chest pain and palpitations.   Gastrointestinal: Negative for nausea and vomiting.   Neurological: Negative for dizziness and syncope.        Objective     /54   Pulse 50   Ht 182.9 cm (72\")   Wt 120 kg (264 lb)   BMI 35.80 kg/m²       Physical Exam    General : Alert, awake, no acute distress  Neck : Supple, no carotid bruit, no jugular venous distention  CVS : Irregular, bradycardic, no murmur, rubs or gallops  Lungs: Clear to auscultation bilaterally, no crackles or rhonchi  Abdomen: Soft, nontender, bowel sounds heard in all 4 quadrants  Extremities: Warm, well-perfused, 1+ edema RLE, AKA LLE      Result Review :     The following data was reviewed by: Pablo Hurley MD on 08/24/2022:      CBC    CBC 8/24/22   WBC 4.34   RBC 4.12 (A)   Hemoglobin 13.1   Hematocrit 39.6   MCV 96.1   MCH 31.8   MCHC 33.1   RDW 13.0   Platelets 201   (A) Abnormal value                     Data reviewed: Cardiology studies               "   Assessment and Plan        Diagnoses and all orders for this visit:    1. SOB (shortness of breath) (Primary)  Assessment & Plan:  She reports increasing shortness of breath, fatigue and edema.  Clinically volume overloaded with lung crackles on auscultation.  Will initiate Lasix 40 mg once daily.  We will check proBNP, CMP and CBC today.  He will need close follow-up in the next 1 to 2 weeks.  Eventually will need repeat echocardiogram and possibly stress testing once shortness of breath is better.  Encouraged to return to the ER for any worsening symptoms even after starting Lasix.    Orders:  -     proBNP; Future  -     Comprehensive Metabolic Panel; Future  -     Magnesium; Future  -     CBC (No Diff); Future  -     furosemide (LASIX) 40 MG tablet; Take 1 tablet by mouth Daily.  Dispense: 30 tablet; Refill: 1    2. Longstanding persistent atrial fibrillation (HCC)  Assessment & Plan:  He remains in atrial fibrillation however ventricular rates are very slow, which is a new finding.  Recent EKG done 2 days back showed A. fib.  We will decrease the dose of metoprolol succinate to 50 mg twice daily.  Continue Eliquis for anticoagulation.  CBC to be done as mentioned above.      3. Essential hypertension  Assessment & Plan:  Blood pressure reasonably well controlled.  Decreasing dose of metoprolol as mentioned above.              Follow Up     Return in about 2 weeks (around 9/7/2022) for Recheck, OK to double book .    Patient was given instructions and counseling regarding his condition or for health maintenance advice. Please see specific information pulled into the AVS if appropriate.

## 2022-08-24 NOTE — ASSESSMENT & PLAN NOTE
She reports increasing shortness of breath, fatigue and edema.  Clinically volume overloaded with lung crackles on auscultation.  Will initiate Lasix 40 mg once daily.  We will check proBNP, CMP and CBC today.  He will need close follow-up in the next 1 to 2 weeks.  Eventually will need repeat echocardiogram and possibly stress testing once shortness of breath is better.  Encouraged to return to the ER for any worsening symptoms even after starting Lasix.

## 2022-08-24 NOTE — ASSESSMENT & PLAN NOTE
He remains in atrial fibrillation however ventricular rates are very slow, which is a new finding.  Recent EKG done 2 days back showed A. fib.  We will decrease the dose of metoprolol succinate to 50 mg twice daily.  Continue Eliquis for anticoagulation.  CBC to be done as mentioned above.

## 2022-08-25 NOTE — TELEPHONE ENCOUNTER
----- Message from Palbo Hurley MD sent at 8/25/2022  1:52 PM EDT -----  Labs done yesterday showed mild worsening of kidney functions and low sodium.  Test for heart failure (BNP) also came back as mildly elevated.  This changes can happen with fluid retention. Potassium levels and blood counts are within normal limits.    At this time, recommend to continue Lasix which was started yesterday.  He will need a repeat BMP to be done on next Monday (8/29) before the follow-up visit on 8/30/2022.     If shortness of breath or swelling is getting worse in the meantime please call us back or in the worse scenario, go back to the ER.      Electronically signed by Pablo Hurley MD, 08/25/22, 1:52 PM EDT.

## 2022-08-25 NOTE — PROGRESS NOTES
Labs done yesterday showed mild worsening of kidney functions and low sodium.  Test for heart failure (BNP) also came back as mildly elevated.  This changes can happen with fluid retention. Potassium levels and blood counts are within normal limits.    At this time, recommend to continue Lasix which was started yesterday.  He will need a repeat BMP to be done on next Monday (8/29) before the follow-up visit on 8/30/2022.     If shortness of breath or swelling is getting worse in the meantime please call us back or in the worse scenario, go back to the ER.      Electronically signed by Pablo Hurley MD, 08/25/22, 1:52 PM EDT.

## 2022-09-28 NOTE — PROGRESS NOTES
CARDIOLOGY FOLLOW-UP PROGRESS NOTE        Chief Complaint  Hypertension, Atrial Fibrillation, Follow-up, and Shortness of Breath    Subjective            Wesley Billingsley presents to [facility] CARDIOLOGY  History of Present Illness    Mr. Billingsley is here for follow-up visit.  He was seen last month because of increasing shortness of breath swelling and weight gain.  He was clinically volume overloaded.  Diuretics were added to the regimen.  Patient had COVID 19 infection in the next week hence missed a close follow-up appointment.  Today, he reports feeling better.  The pedal edema and shortness of breath improved.  He denies having any chest pain.  No palpitations.      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     Medical History:  Past Medical History:   Diagnosis Date   • Atrial fibrillation (HCC)     chronic   • Hypertension        Surgical History: has a past surgical history that includes Knee surgery.     Family History: family history includes Cancer in his sister; Diabetes in his mother; Heart disease in his father and mother; Hypertension in his father and mother.     Social History: reports that he has never smoked. He has never used smokeless tobacco. He reports that he does not drink alcohol and does not use drugs.    Allergies: Patient has no known allergies.    Current Outpatient Medications on File Prior to Visit   Medication Sig   • ALPRAZolam (XANAX) 0.5 MG tablet    • amLODIPine (NORVASC) 10 MG tablet 1 tablet Daily.   • apixaban (ELIQUIS) 5 MG tablet tablet Take 5 mg by mouth 2 (Two) Times a Day.   • cetirizine (zyrTEC) 10 MG tablet Take 10 mg by mouth Daily.   •  "gabapentin (NEURONTIN) 300 MG capsule Six times a day   • metoprolol succinate XL (TOPROL-XL) 100 MG 24 hr tablet Take 0.5 tablets by mouth 2 (Two) Times a Day.   • multivitamin with minerals tablet tablet Take 1 tablet by mouth Daily.   • oxyCODONE-acetaminophen (PERCOCET)  MG per tablet    • valsartan (DIOVAN) 80 MG tablet Take 1 tablet by mouth Daily. (Patient taking differently: Take 160 mg by mouth Daily.)   • zolpidem (AMBIEN) 10 MG tablet      No current facility-administered medications on file prior to visit.          Review of Systems   Constitutional: Positive for fatigue.   Respiratory: Positive for shortness of breath. Negative for cough and wheezing.    Cardiovascular: Positive for leg swelling. Negative for chest pain and palpitations.   Gastrointestinal: Negative for nausea and vomiting.   Neurological: Negative for dizziness and syncope.        Objective     /49   Pulse 69   Ht 182.9 cm (72\")   Wt 124 kg (274 lb)   BMI 37.16 kg/m²       Physical Exam    General : Alert, awake, no acute distress  Neck : Supple, no carotid bruit, no jugular venous distention  CVS : Irregularly irregular.  No murmur, rubs or gallops  Lungs: Clear to auscultation bilaterally, no crackles or rhonchi  Abdomen: Soft, nontender, bowel sounds heard in all 4 quadrants  Extremities: Warm, well-perfused, 1+ edema RLE, status post amputation LLE    Result Review :     The following data was reviewed by: Pablo Hurley MD on 09/21/2022:    CMP    CMP 8/24/22 9/20/22   Glucose 102 (A) 89   BUN 20 18   Creatinine 1.35 (A) 0.90   Sodium 126 (A) 135 (A)   Potassium 4.3 4.3   Chloride 88 (A) 96 (A)   Calcium 9.4 9.1   Albumin 4.40 4.50   Total Bilirubin 0.3 0.6   Alkaline Phosphatase 48 57   AST (SGOT) 26 20   ALT (SGPT) 19 15   (A) Abnormal value            CBC    CBC 8/24/22 9/20/22   WBC 4.34 4.26   RBC 4.12 (A) 3.77 (A)   Hemoglobin 13.1 12.0 (A)   Hematocrit 39.6 37.9   MCV 96.1 100.5 (A)   MCH 31.8 31.8   MCHC " 33.1 31.7   RDW 13.0 13.8   Platelets 201 179   (A) Abnormal value            TSH    TSH 9/20/22   TSH 6.140 (A)   (A) Abnormal value            Lipid Panel    Lipid Panel 9/20/22   Total Cholesterol 147   Triglycerides 63   HDL Cholesterol 63 (A)   VLDL Cholesterol 13   LDL Cholesterol  71   LDL/HDL Ratio 1.13   (A) Abnormal value                          Assessment and Plan        Diagnoses and all orders for this visit:    1. Longstanding persistent atrial fibrillation (HCC) (Primary)  Assessment & Plan:  Ventricular rates are well controlled, we will continue current dose of metoprolol succinate 50 mg twice daily.  The dose was decreased during last visit due to bradycardia.  Continue Eliquis for anticoagulation.    Orders:  -     Adult Transthoracic Echo Complete W/ Cont if Necessary Per Protocol; Future    2. SOB (shortness of breath)  Assessment & Plan:  He is clinically volume overloaded.  Symptoms are better after starting the Lasix.  However, he is not back to his baseline and weight is still above his regular weight.  We will increase the dose of Lasix to 60 mg daily.  Labs done yesterday showed stable electrolytes and renal functions.  We will proceed with repeat echocardiogram to reassess the LV function.    Orders:  -     furosemide (LASIX) 40 MG tablet; Take 1.5 tablets by mouth Daily.  Dispense: 135 tablet; Refill: 3  -     Adult Transthoracic Echo Complete W/ Cont if Necessary Per Protocol; Future    3. Essential hypertension  Assessment & Plan:  Reasonably well-controlled.  Continue amlodipine, metoprolol and valsartan at the current dose.              Follow Up     We will follow the echocardiogram reports, otherwise follow-up in 6 weeks  Return in about 6 weeks (around 11/2/2022) for Recheck.    Patient was given instructions and counseling regarding his condition or for health maintenance advice. Please see specific information pulled into the AVS if appropriate.

## 2022-09-28 NOTE — ASSESSMENT & PLAN NOTE
He is clinically volume overloaded.  Symptoms are better after starting the Lasix.  However, he is not back to his baseline and weight is still above his regular weight.  We will increase the dose of Lasix to 60 mg daily.  Labs done yesterday showed stable electrolytes and renal functions.  We will proceed with repeat echocardiogram to reassess the LV function.

## 2022-09-28 NOTE — ASSESSMENT & PLAN NOTE
Ventricular rates are well controlled, we will continue current dose of metoprolol succinate 50 mg twice daily.  The dose was decreased during last visit due to bradycardia.  Continue Eliquis for anticoagulation.

## 2022-10-31 ENCOUNTER — APPOINTMENT (OUTPATIENT)
Dept: CARDIOLOGY | Facility: HOSPITAL | Age: 72
End: 2022-10-31